# Patient Record
Sex: MALE
[De-identification: names, ages, dates, MRNs, and addresses within clinical notes are randomized per-mention and may not be internally consistent; named-entity substitution may affect disease eponyms.]

---

## 2020-05-26 PROBLEM — Z00.00 ENCOUNTER FOR PREVENTIVE HEALTH EXAMINATION: Status: ACTIVE | Noted: 2020-05-26

## 2020-06-02 ENCOUNTER — APPOINTMENT (OUTPATIENT)
Dept: NEUROLOGY | Facility: CLINIC | Age: 65
End: 2020-06-02
Payer: COMMERCIAL

## 2020-06-02 VITALS — OXYGEN SATURATION: 98 % | TEMPERATURE: 99.1 F | WEIGHT: 149 LBS | HEIGHT: 63.5 IN | BODY MASS INDEX: 26.08 KG/M2

## 2020-06-02 VITALS — HEART RATE: 93 BPM | DIASTOLIC BLOOD PRESSURE: 73 MMHG | SYSTOLIC BLOOD PRESSURE: 131 MMHG

## 2020-06-02 DIAGNOSIS — Z86.79 PERSONAL HISTORY OF OTHER DISEASES OF THE CIRCULATORY SYSTEM: ICD-10-CM

## 2020-06-02 DIAGNOSIS — Z86.39 PERSONAL HISTORY OF OTHER ENDOCRINE, NUTRITIONAL AND METABOLIC DISEASE: ICD-10-CM

## 2020-06-02 DIAGNOSIS — Z78.9 OTHER SPECIFIED HEALTH STATUS: ICD-10-CM

## 2020-06-02 DIAGNOSIS — F17.200 NICOTINE DEPENDENCE, UNSPECIFIED, UNCOMPLICATED: ICD-10-CM

## 2020-06-02 PROCEDURE — 99204 OFFICE O/P NEW MOD 45 MIN: CPT

## 2020-06-02 RX ORDER — SEMAGLUTIDE 1.34 MG/ML
2 INJECTION, SOLUTION SUBCUTANEOUS
Refills: 0 | Status: ACTIVE | COMMUNITY

## 2020-06-02 RX ORDER — GABAPENTIN 300 MG
300 TABLET ORAL
Refills: 0 | Status: ACTIVE | COMMUNITY

## 2020-06-02 RX ORDER — NICOTINE 21 MG/24HR
21 PATCH, TRANSDERMAL 24 HOURS TRANSDERMAL
Refills: 0 | Status: ACTIVE | COMMUNITY

## 2020-06-02 RX ORDER — DAPAGLIFLOZIN 10 MG/1
TABLET, FILM COATED ORAL
Refills: 0 | Status: ACTIVE | COMMUNITY

## 2020-06-02 RX ORDER — LEVOTHYROXINE SODIUM 112 UG/1
112 TABLET ORAL
Refills: 0 | Status: ACTIVE | COMMUNITY

## 2020-06-02 RX ORDER — GLIPIZIDE 5 MG/1
5 TABLET ORAL
Refills: 0 | Status: ACTIVE | COMMUNITY

## 2020-06-02 RX ORDER — INSULIN DEGLUDEC INJECTION 200 U/ML
INJECTION, SOLUTION SUBCUTANEOUS
Refills: 0 | Status: ACTIVE | COMMUNITY

## 2020-06-02 RX ORDER — LISINOPRIL 20 MG/1
20 TABLET ORAL
Refills: 0 | Status: ACTIVE | COMMUNITY

## 2020-06-02 RX ORDER — SIMVASTATIN 80 MG/1
80 TABLET, FILM COATED ORAL
Refills: 0 | Status: ACTIVE | COMMUNITY

## 2020-06-02 NOTE — REVIEW OF SYSTEMS
[As Noted in HPI] : as noted in HPI [Chills] : no chills [Fever] : no fever [Anxiety] : no anxiety [Depression] : no depression [Eyesight Problems] : no eyesight problems [Loss Of Hearing] : no hearing loss [Chest Pain] : no chest pain [Shortness Of Breath] : no shortness of breath [Vomiting] : no vomiting [Joint Pain] : no joint pain [Incontinence] : no incontinence [Muscle Weakness] : no muscle weakness [Easy Bleeding] : no tendency for easy bleeding [Itching] : no itching

## 2020-06-02 NOTE — ASSESSMENT
[FreeTextEntry1] : Head tremor, and at times hand tremor, without any bradykinesia, masked facies, hypophonia, cogwheeling or shuffling gait/ difficulty turning likely due to benign tremor but will check TFT and MRI brain to evaluate for other etiology.  WIll discuss treatment options at next visit.

## 2020-06-02 NOTE — HISTORY OF PRESENT ILLNESS
[FreeTextEntry1] : The patient has h/o of DM and hypothyroidism and is here for head tremor which started early 2020.  The patient notes the head tremor when he is reading.  He also notes tremor in his right hand when he holds something but he is unable to tell me if he has any tremor in the left hand.  Unclear if there is any correlation of the tremor to alcohol or caffein.  He has not had any falls, dizziness or difficulty with sleep.  No focal symptoms of a stroke.\par \par No family history of Parkison's disease or tremor.

## 2020-06-02 NOTE — CONSULT LETTER
[Dear  ___] : Dear  [unfilled], [Consult Letter:] : I had the pleasure of evaluating your patient, [unfilled]. [Please see my note below.] : Please see my note below. [Consult Closing:] : Thank you very much for allowing me to participate in the care of this patient.  If you have any questions, please do not hesitate to contact me. [Sincerely,] : Sincerely, [FreeTextEntry3] : Sommer Landis MD, MPH\par

## 2020-06-02 NOTE — PHYSICAL EXAM
[General Appearance - In No Acute Distress] : in no acute distress [General Appearance - Alert] : alert [Person] : oriented to person [Place] : oriented to place [Time] : oriented to time [Concentration Intact] : normal concentrating ability [Registration Intact] : recent registration memory intact [Visual Intact] : visual attention was ~T not ~L decreased [Naming Objects] : no difficulty naming common objects [Repeating Phrases] : no difficulty repeating a phrase [Fluency] : fluency intact [Comprehension] : comprehension intact [Vocabulary] : adequate range of vocabulary [Cranial Nerves Oculomotor (III)] : extraocular motion intact [Cranial Nerves Trigeminal (V)] : facial sensation intact symmetrically [Cranial Nerves Optic (II)] : visual acuity intact bilaterally,  visual fields full to confrontation, pupils equal round and reactive to light [Cranial Nerves Facial (VII)] : face symmetrical [Cranial Nerves Vestibulocochlear (VIII)] : hearing was intact bilaterally [Cranial Nerves Glossopharyngeal (IX)] : tongue and palate midline [Motor Tone] : muscle tone was normal in all four extremities [Cranial Nerves Hypoglossal (XII)] : there was no tongue deviation with protrusion [Motor Strength] : muscle strength was normal in all four extremities [Cranial Nerves Accessory (XI - Cranial And Spinal)] : head turning and shoulder shrug symmetric [Involuntary Movements] : no involuntary movements were seen [Sensation Tactile Decrease] : light touch was intact [Abnormal Walk] : normal gait [Balance] : balance was intact [1+] : Ankle jerk left 1+ [Full Pulse] : the pedal pulses are present [Tremor] : a tremor present [Coordination - Dysmetria Impaired Finger-to-Nose Bilateral] : not present [Plantar Reflex Right Only] : normal on the right [Coordination - Dysmetria Impaired Heel-to-Shin Bilateral] : not present [Plantar Reflex Left Only] : normal on the left [FreeTextEntry6] : No bradykinesia, masked facies, hypophonia, cogwheeling or shuffling gait/ difficulty turning [FreeTextEntry5] : fundi not visualized [FreeTextEntry9] : + head tremor

## 2020-06-03 LAB
T3 SERPL-MCNC: 83 NG/DL
T4 SERPL-MCNC: 9.4 UG/DL
TSH SERPL-ACNC: 0.64 UIU/ML

## 2020-06-20 ENCOUNTER — OUTPATIENT (OUTPATIENT)
Dept: OUTPATIENT SERVICES | Facility: HOSPITAL | Age: 65
LOS: 1 days | End: 2020-06-20
Payer: COMMERCIAL

## 2020-06-20 ENCOUNTER — APPOINTMENT (OUTPATIENT)
Dept: MRI IMAGING | Facility: IMAGING CENTER | Age: 65
End: 2020-06-20
Payer: COMMERCIAL

## 2020-06-20 DIAGNOSIS — R25.1 TREMOR, UNSPECIFIED: ICD-10-CM

## 2020-06-20 PROCEDURE — 70551 MRI BRAIN STEM W/O DYE: CPT

## 2020-06-20 PROCEDURE — 70551 MRI BRAIN STEM W/O DYE: CPT | Mod: 26

## 2020-07-27 ENCOUNTER — APPOINTMENT (OUTPATIENT)
Dept: NEUROLOGY | Facility: CLINIC | Age: 65
End: 2020-07-27
Payer: COMMERCIAL

## 2020-07-27 PROCEDURE — 99214 OFFICE O/P EST MOD 30 MIN: CPT | Mod: 95

## 2020-07-27 NOTE — PHYSICAL EXAM
[General Appearance - Alert] : alert [General Appearance - In No Acute Distress] : in no acute distress [Person] : oriented to person [Place] : oriented to place [Concentration Intact] : normal concentrating ability [Time] : oriented to time [Naming Objects] : no difficulty naming common objects [Repeating Phrases] : no difficulty repeating a phrase [Fluency] : fluency intact [Comprehension] : comprehension intact [Vocabulary] : adequate range of vocabulary [Cranial Nerves Oculomotor (III)] : extraocular motion intact [Cranial Nerves Facial (VII)] : face symmetrical

## 2020-07-27 NOTE — ASSESSMENT
[FreeTextEntry1] : Head tremor, and at times hand tremor, without any bradykinesia, masked facies, hypophonia, cogwheeling or shuffling gait/ difficulty turning likely due to benign tremor with unremarkable TFT and MRI brain.  Will start Propranolol for tremor (pulse was in 90's at last visit).

## 2020-07-27 NOTE — HISTORY OF PRESENT ILLNESS
[Verbal consent obtained from patient] : the patient, [unfilled] [Home] : at home, [unfilled] , at the time of the visit. [Other Location: e.g. Home (Enter Location, City,State)___] : at [unfilled] [FreeTextEntry4] : [place] [FreeTextEntry1] : The patient has h/o of DM and hypothyroidism and is here for head tremor which started early 2020. The patient notes the head tremor when he is reading. He also notes tremor in his right hand when he holds something but he is unable to tell me if he has any tremor in the left hand. Unclear if there is any correlation of the tremor to alcohol or caffein. He has not had any falls, dizziness or difficulty with sleep. No focal symptoms of a stroke.\par \par No family history of Parkison's disease or tremor. \par No clinical change since last visit.\par

## 2020-09-14 ENCOUNTER — APPOINTMENT (OUTPATIENT)
Dept: NEUROLOGY | Facility: CLINIC | Age: 65
End: 2020-09-14
Payer: COMMERCIAL

## 2020-09-14 VITALS
HEIGHT: 63.5 IN | OXYGEN SATURATION: 99 % | HEART RATE: 73 BPM | BODY MASS INDEX: 26.25 KG/M2 | TEMPERATURE: 98.7 F | WEIGHT: 150 LBS | DIASTOLIC BLOOD PRESSURE: 75 MMHG | SYSTOLIC BLOOD PRESSURE: 161 MMHG

## 2020-09-14 PROCEDURE — 99213 OFFICE O/P EST LOW 20 MIN: CPT

## 2020-09-14 RX ORDER — PROPRANOLOL HYDROCHLORIDE 40 MG/1
40 TABLET ORAL
Qty: 180 | Refills: 1 | Status: ACTIVE | COMMUNITY
Start: 2020-07-27 | End: 1900-01-01

## 2020-09-14 NOTE — HISTORY OF PRESENT ILLNESS
[FreeTextEntry1] : The patient has h/o of DM and hypothyroidism and is here for head tremor which started early 2020. The patient notes the head tremor when he is reading. He also notes tremor in his right hand when he holds something but he is unable to tell me if he has any tremor in the left hand. Unclear if there is any correlation of the tremor to alcohol or caffein. He has not had any falls, dizziness or difficulty with sleep. No focal symptoms of a stroke.\par \par At last visit, the patient was started on Propranolol 40mg bid with good control of the head tremor and without any side effects.

## 2020-09-14 NOTE — PHYSICAL EXAM
[General Appearance - Alert] : alert [General Appearance - In No Acute Distress] : in no acute distress [Person] : oriented to person [Concentration Intact] : normal concentrating ability [Place] : oriented to place [Time] : oriented to time [Fluency] : fluency intact [Naming Objects] : no difficulty naming common objects [Repeating Phrases] : no difficulty repeating a phrase [Cranial Nerves Optic (II)] : visual acuity intact bilaterally,  visual fields full to confrontation, pupils equal round and reactive to light [Comprehension] : comprehension intact [Vocabulary] : adequate range of vocabulary [Cranial Nerves Oculomotor (III)] : extraocular motion intact [Motor Strength] : muscle strength was normal in all four extremities [Motor Tone] : muscle tone was normal in all four extremities [Abnormal Walk] : normal gait [Balance] : balance was intact [Coordination - Dysmetria Impaired Heel-to-Shin Bilateral] : not present [Coordination - Dysmetria Impaired Finger-to-Nose Bilateral] : not present [FreeTextEntry6] : no hand tremor, mild head tremor with distruction

## 2021-03-02 ENCOUNTER — APPOINTMENT (OUTPATIENT)
Dept: NEUROLOGY | Facility: CLINIC | Age: 66
End: 2021-03-02
Payer: COMMERCIAL

## 2021-03-02 VITALS
TEMPERATURE: 98.8 F | HEIGHT: 63.5 IN | BODY MASS INDEX: 27.12 KG/M2 | OXYGEN SATURATION: 97 % | DIASTOLIC BLOOD PRESSURE: 71 MMHG | SYSTOLIC BLOOD PRESSURE: 129 MMHG | WEIGHT: 155 LBS | HEART RATE: 75 BPM

## 2021-03-02 PROCEDURE — 99214 OFFICE O/P EST MOD 30 MIN: CPT

## 2021-03-02 PROCEDURE — 99072 ADDL SUPL MATRL&STAF TM PHE: CPT

## 2021-03-02 NOTE — ASSESSMENT
[FreeTextEntry1] : Head tremor, and at times hand tremor, without any bradykinesia, masked facies, hypophonia, cogwheeling or shuffling gait/ difficulty turning likely due to benign tremor with unremarkable TFT and MRI brain. Will increase to Propranolol 60mg bid for tremor. \par

## 2021-03-02 NOTE — PHYSICAL EXAM
[General Appearance - Alert] : alert [General Appearance - In No Acute Distress] : in no acute distress [Person] : oriented to person [Place] : oriented to place [Time] : oriented to time [Concentration Intact] : normal concentrating ability [Naming Objects] : no difficulty naming common objects [Fluency] : fluency intact [Comprehension] : comprehension intact [Motor Tone] : muscle tone was normal in all four extremities [Motor Strength] : muscle strength was normal in all four extremities [Sensation Tactile Decrease] : light touch was intact [Abnormal Walk] : normal gait [Balance] : balance was intact [FreeTextEntry6] : mild head tremor

## 2021-03-02 NOTE — HISTORY OF PRESENT ILLNESS
[FreeTextEntry1] : The patient has h/o of DM and hypothyroidism and is here for head tremor which started early 2020. The patient notes the head tremor when he is reading. He also notes tremor in his right hand when he holds something but he is unable to tell me if he has any tremor in the left hand. Unclear if there is any correlation of the tremor to alcohol or caffein. He has not had any falls, dizziness or difficulty with sleep. No focal symptoms of a stroke.\par \par The patient is on Propranolol 40mg bid with reasonable control of the head tremor but has difficulty with the tremor at least once per day.  NO side effects to the  medication.\par

## 2021-03-03 ENCOUNTER — TRANSCRIPTION ENCOUNTER (OUTPATIENT)
Age: 66
End: 2021-03-03

## 2021-07-02 ENCOUNTER — APPOINTMENT (OUTPATIENT)
Dept: NEUROLOGY | Facility: CLINIC | Age: 66
End: 2021-07-02
Payer: COMMERCIAL

## 2021-07-02 VITALS
HEIGHT: 63.5 IN | BODY MASS INDEX: 27.3 KG/M2 | SYSTOLIC BLOOD PRESSURE: 143 MMHG | OXYGEN SATURATION: 98 % | DIASTOLIC BLOOD PRESSURE: 74 MMHG | WEIGHT: 156 LBS | HEART RATE: 68 BPM | TEMPERATURE: 97.3 F

## 2021-07-02 DIAGNOSIS — R25.1 TREMOR, UNSPECIFIED: ICD-10-CM

## 2021-07-02 PROCEDURE — 99214 OFFICE O/P EST MOD 30 MIN: CPT

## 2021-07-02 RX ORDER — PROPRANOLOL HYDROCHLORIDE 60 MG/1
60 TABLET ORAL
Qty: 180 | Refills: 3 | Status: ACTIVE | COMMUNITY
Start: 2021-03-02 | End: 1900-01-01

## 2021-07-02 NOTE — PHYSICAL EXAM
[General Appearance - Alert] : alert [General Appearance - In No Acute Distress] : in no acute distress [Person] : oriented to person [Place] : oriented to place [Time] : oriented to time [Motor Tone] : muscle tone was normal in all four extremities [Involuntary Movements] : no involuntary movements were seen [Motor Strength] : muscle strength was normal in all four extremities [Sensation Tactile Decrease] : light touch was intact [Abnormal Walk] : normal gait [Balance] : balance was intact [FreeTextEntry6] : no coghweeling, tremor, bradykinesia or shiffling or arm swing discrepancies

## 2021-07-02 NOTE — ASSESSMENT
[FreeTextEntry1] : Head tremor, and at times hand tremor, cw essential tremor.  There is no bradykinesia, masked facies, hypophonia, cogwheeling or shuffling gait/ difficulty turning likely due to benign tremor and unremarkable TFT and MRI brain. Will increase to Propranolol 60mg bid for tremor. \par

## 2021-07-02 NOTE — HISTORY OF PRESENT ILLNESS
[FreeTextEntry1] : The patient has h/o of DM and hypothyroidism and is here for head tremor which started early 2020. The patient notes the head tremor when he is reading. He also notes tremor in his right hand when he holds something but he is unable to tell me if he has any tremor in the left hand. Unclear if there is any correlation of the tremor to alcohol or caffein. He has not had any falls, dizziness or difficulty with sleep. No focal symptoms of a stroke.\par \par The patient is on Propranolol 60mg bid patient feels well on this dose and the tremor is mostly well controlled, has it occasionally but is happy with the control.   NO side effects to the medication.\par

## 2022-08-08 ENCOUNTER — APPOINTMENT (OUTPATIENT)
Dept: ORTHOPEDIC SURGERY | Facility: CLINIC | Age: 67
End: 2022-08-08

## 2022-08-08 PROCEDURE — 99455 WORK RELATED DISABILITY EXAM: CPT

## 2022-08-08 PROCEDURE — 99072 ADDL SUPL MATRL&STAF TM PHE: CPT

## 2022-08-08 NOTE — ASSESSMENT
[FreeTextEntry1] : xray show some deg changes\par mri right knee ([facility]) 12/23/20 - no obvious fx, PCL sprain, effusion, synovitis, deg changes, pop cyst\par \par \par right knee SLU - 20% right knee\par \par right knee flexion 120 = 10%\par marked chondomalacia patella = 10%

## 2022-08-08 NOTE — IMAGING
[de-identified] : \par RIGHT KNEE\par Inspection:  mild effusion\par Palpation: medial joint line tenderness, anterior tenderness\par Knee Range of Motion:  3-120\par Strength: 5/5 Quadriceps strength, 5/5 Hamstring strength\par Neurological: light touch is intact throughout\par Ligament Stability and Special Tests: \par McMurrays: neg\par Lachman: neg\par Pivot Shift: neg\par Posterior Drawer: neg\par Valgus: neg\par Varus: neg\par Patella Apprehension: neg\par Patella Maltracking: neg\par \par \par LEFT / KNEE\par Inspection:  no effusion\par Palpation: no tenderness\par Knee Range of Motion:  0-135  \par Strength: 5/5 Quadriceps strength, 5/5 Hamstring strength\par Neurological: light touch is intact throughout\par Ligament Stability and Special Tests: \par Able to SLR\par McMurrays: neg\par Lachman: neg\par Pivot Shift: neg\par Posterior Drawer: neg\par Valgus: neg\par Varus: neg\par Patella Apprehension: neg\par Patella Maltracking: neg\par Gait: non-antalgic\par \par \par

## 2022-08-08 NOTE — WORK
[Has the patient reached Maximum Medical Improvement? If yes, indicate date___] : Yes, on [unfilled] [Is there permanent partial impairment?] : Yes [FreeTextEntry6] : \par \par right knee SLU - 20% right knee\par \par right knee flexion 120 = 10%\par marked chondomalacia patella = 10% [Right] : right [Yes] : Yes [FreeTextEntry5] : 20 [de-identified] : right knee SLU - 20% right knee\par \par right knee flexion 120 = 10%\par marked chondomalacia patella = 10%

## 2022-08-08 NOTE — HISTORY OF PRESENT ILLNESS
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 fell on floor striking knee onto the floor. pain located at the anterior aspect of right knee. pain associated with swelling and abrasions at the anterior knee. pain worse with attempting to flex knee. has tried Icing and antibiotics. and left hand injury at work on 12/4/20. Being seen for knee today\par \par 12/31/20 - had mri of knee, still having anterior pain\par 1/11/21 - bring mri disc and report for me to review imaging, still having discomfort, hasn't started PT yet\par 2/25/21 - hasn't got approva for PT, doing icing and HEP\par 4/7/21 - still having right knee discomfort with avtivity, just got re-approved for PT and starting back up, having worsening pain\par 5/26/21 - doing PT/HEP but still pain in knee\par 6/21/21 - wants1 to discuss poss visco\par 6/28/21 - right eulfexxa #2\par 7/5/21 - right euflexxa #3\par \par 12/20/21 - right knee SLU , mild improvement from prior visco\par 8/8/22 - sent by  to [facility] another [facility], no change in symtoms\par \par

## 2022-09-29 ENCOUNTER — APPOINTMENT (OUTPATIENT)
Dept: ORTHOPEDIC SURGERY | Facility: CLINIC | Age: 67
End: 2022-09-29

## 2022-09-29 DIAGNOSIS — M17.11 UNILATERAL PRIMARY OSTEOARTHRITIS, RIGHT KNEE: ICD-10-CM

## 2022-09-29 PROCEDURE — 99072 ADDL SUPL MATRL&STAF TM PHE: CPT

## 2022-09-29 PROCEDURE — 99455 WORK RELATED DISABILITY EXAM: CPT

## 2022-09-29 NOTE — HISTORY OF PRESENT ILLNESS
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 fell on floor striking knee onto the floor. pain located at the anterior aspect of right knee. pain associated with swelling and abrasions at the anterior knee. pain worse with attempting to flex knee. has tried Icing and antibiotics. and left hand injury at work on 12/4/20. Being seen for knee today\par \par 12/31/20 - had mri of knee, still having anterior pain\par 1/11/21 - bring mri disc and report for me to review imaging, still having discomfort, hasn't started PT yet\par 2/25/21 - hasn't got approva for PT, doing icing and HEP\par 4/7/21 - still having right knee discomfort with avtivity, just got re-approved for PT and starting back up, having worsening pain\par 5/26/21 - doing PT/HEP but still pain in knee\par 6/21/21 - wants1 to discuss poss visco\par 6/28/21 - right eulfexxa #2\par 7/5/21 - right euflexxa #3\par \par 12/20/21 - right knee SLU , mild improvement from prior visco\par 8/8/22 - [facility], no change in symtoms\par 9/29/2022-pt continues right knee pain, no current change in symptoms\par \par

## 2022-09-29 NOTE — WORK
[Has the patient reached Maximum Medical Improvement? If yes, indicate date___] : Yes, on [unfilled] [Is there permanent partial impairment?] : Yes [Right] : right [Yes] : Yes [Total] : total [Reveals pre-existing condition(s) that may affect treatment/prognosis] : reveals pre-existing condition(s) that may affect treatment/prognosis [Cannot return to work because ________] : cannot return to work because [unfilled] [Patient] : patient [FreeTextEntry2] : knee injury 9/28/16 [FreeTextEntry6] : \par \par right knee [facility] - 20% right knee\par \par right knee flexion 120 = 10%\par marked chondomalacia patella = 10% [FreeTextEntry5] : 20 [de-identified] : right knee SLU - 20% right knee\par \par right knee flexion 120 = 10%\par marked chondomalacia patella = 10%

## 2022-09-29 NOTE — IMAGING
[de-identified] : \par RIGHT KNEE\par Inspection:  mild effusion\par Palpation: medial joint line tenderness, anterior tenderness\par Knee Range of Motion:  3-120\par Strength: 5/5 Quadriceps strength, 5/5 Hamstring strength\par Neurological: light touch is intact throughout\par Ligament Stability and Special Tests: \par McMurrays: neg\par Lachman: neg\par Pivot Shift: neg\par Posterior Drawer: neg\par Valgus: neg\par Varus: neg\par Patella Apprehension: neg\par Patella Maltracking: neg\par \par \par LEFT KNEE\par Inspection:  no effusion\par Palpation: no tenderness\par Knee Range of Motion:  0-135  \par Strength: 5/5 Quadriceps strength, 5/5 Hamstring strength\par Neurological: light touch is intact throughout\par Ligament Stability and Special Tests: \par Able to SLR\par McMurrays: neg\par Lachman: neg\par Pivot Shift: neg\par Posterior Drawer: neg\par Valgus: neg\par Varus: neg\par Patella Apprehension: neg\par Patella Maltracking: neg\par Gait: non-antalgic\par \par \par

## 2022-09-29 NOTE — ASSESSMENT
[FreeTextEntry1] : xray show some deg changes\par mri right knee ([facility]) 12/23/20 - no obvious fx, PCL sprain, effusion, synovitis, deg changes, pop cyst\par \par \par right knee [facility] - 20% right knee\par \par right knee flexion 120 = 10%\par marked chondomalacia patella = 10%\par \par \par **of note patient had prior work injury on 9/28/16 with 20% SLU per patient and **\par ***there is no additional loss of use on top of that from his injury on 12/4/20***

## 2022-10-26 ENCOUNTER — APPOINTMENT (OUTPATIENT)
Dept: NEUROLOGY | Facility: CLINIC | Age: 67
End: 2022-10-26